# Patient Record
Sex: FEMALE | Race: WHITE | Employment: OTHER | URBAN - METROPOLITAN AREA
[De-identification: names, ages, dates, MRNs, and addresses within clinical notes are randomized per-mention and may not be internally consistent; named-entity substitution may affect disease eponyms.]

---

## 2022-10-03 ENCOUNTER — APPOINTMENT (OUTPATIENT)
Dept: CT IMAGING | Age: 72
End: 2022-10-03
Attending: EMERGENCY MEDICINE
Payer: MEDICARE

## 2022-10-03 ENCOUNTER — APPOINTMENT (OUTPATIENT)
Dept: MRI IMAGING | Age: 72
End: 2022-10-03
Attending: EMERGENCY MEDICINE
Payer: MEDICARE

## 2022-10-03 ENCOUNTER — HOSPITAL ENCOUNTER (EMERGENCY)
Age: 72
Discharge: ANOTHER HOSPITAL | End: 2022-10-04
Attending: EMERGENCY MEDICINE
Payer: MEDICARE

## 2022-10-03 ENCOUNTER — APPOINTMENT (OUTPATIENT)
Dept: GENERAL RADIOLOGY | Age: 72
End: 2022-10-03
Attending: EMERGENCY MEDICINE
Payer: MEDICARE

## 2022-10-03 VITALS
TEMPERATURE: 98.2 F | OXYGEN SATURATION: 96 % | SYSTOLIC BLOOD PRESSURE: 145 MMHG | HEIGHT: 64 IN | WEIGHT: 132 LBS | HEART RATE: 92 BPM | RESPIRATION RATE: 15 BRPM | DIASTOLIC BLOOD PRESSURE: 67 MMHG | BODY MASS INDEX: 22.53 KG/M2

## 2022-10-03 DIAGNOSIS — I61.5: Primary | ICD-10-CM

## 2022-10-03 DIAGNOSIS — G93.40 ENCEPHALOPATHY: ICD-10-CM

## 2022-10-03 LAB
ALBUMIN SERPL-MCNC: 3.7 G/DL (ref 3.4–5)
ALBUMIN/GLOB SERPL: 0.9 {RATIO} (ref 0.8–1.7)
ALP SERPL-CCNC: 151 U/L (ref 45–117)
ALT SERPL-CCNC: 24 U/L (ref 13–56)
ANION GAP SERPL CALC-SCNC: 8 MMOL/L (ref 3–18)
APPEARANCE UR: ABNORMAL
AST SERPL-CCNC: 27 U/L (ref 10–38)
BACTERIA URNS QL MICRO: ABNORMAL /HPF
BASOPHILS # BLD: 0 K/UL (ref 0–0.1)
BASOPHILS NFR BLD: 0 % (ref 0–2)
BILIRUB SERPL-MCNC: 1.8 MG/DL (ref 0.2–1)
BILIRUB UR QL: NEGATIVE
BUN SERPL-MCNC: 33 MG/DL (ref 7–18)
BUN/CREAT SERPL: 36 (ref 12–20)
CALCIUM SERPL-MCNC: 9.8 MG/DL (ref 8.5–10.1)
CALCULATED R AXIS, ECG10: 45 DEGREES
CALCULATED R AXIS, ECG10: 53 DEGREES
CALCULATED T AXIS, ECG11: 82 DEGREES
CALCULATED T AXIS, ECG11: 84 DEGREES
CHLORIDE SERPL-SCNC: 96 MMOL/L (ref 100–111)
CO2 SERPL-SCNC: 30 MMOL/L (ref 21–32)
COLOR UR: YELLOW
COVID-19 RAPID TEST, COVR: NOT DETECTED
CREAT SERPL-MCNC: 0.91 MG/DL (ref 0.6–1.3)
DIAGNOSIS, 93000: NORMAL
DIAGNOSIS, 93000: NORMAL
DIFFERENTIAL METHOD BLD: ABNORMAL
EOSINOPHIL # BLD: 0 K/UL (ref 0–0.4)
EOSINOPHIL NFR BLD: 0 % (ref 0–5)
EPITH CASTS URNS QL MICRO: ABNORMAL /LPF (ref 0–5)
ERYTHROCYTE [DISTWIDTH] IN BLOOD BY AUTOMATED COUNT: 12.8 % (ref 11.6–14.5)
GLOBULIN SER CALC-MCNC: 4 G/DL (ref 2–4)
GLUCOSE SERPL-MCNC: 153 MG/DL (ref 74–99)
GLUCOSE UR STRIP.AUTO-MCNC: NEGATIVE MG/DL
HCT VFR BLD AUTO: 51.2 % (ref 35–45)
HGB BLD-MCNC: 17.2 G/DL (ref 12–16)
HGB UR QL STRIP: ABNORMAL
IMM GRANULOCYTES # BLD AUTO: 0.1 K/UL (ref 0–0.04)
IMM GRANULOCYTES NFR BLD AUTO: 1 % (ref 0–0.5)
KETONES UR QL STRIP.AUTO: ABNORMAL MG/DL
LACTATE SERPL-SCNC: 1.9 MMOL/L (ref 0.4–2)
LEUKOCYTE ESTERASE UR QL STRIP.AUTO: ABNORMAL
LYMPHOCYTES # BLD: 3.1 K/UL (ref 0.9–3.6)
LYMPHOCYTES NFR BLD: 18 % (ref 21–52)
MCH RBC QN AUTO: 30.9 PG (ref 24–34)
MCHC RBC AUTO-ENTMCNC: 33.6 G/DL (ref 31–37)
MCV RBC AUTO: 91.9 FL (ref 78–100)
MONOCYTES # BLD: 0.9 K/UL (ref 0.05–1.2)
MONOCYTES NFR BLD: 5 % (ref 3–10)
NEUTS SEG # BLD: 12.5 K/UL (ref 1.8–8)
NEUTS SEG NFR BLD: 76 % (ref 40–73)
NITRITE UR QL STRIP.AUTO: NEGATIVE
NRBC # BLD: 0 K/UL (ref 0–0.01)
NRBC BLD-RTO: 0 PER 100 WBC
PH UR STRIP: 5.5 [PH] (ref 5–8)
PLATELET # BLD AUTO: 386 K/UL (ref 135–420)
PMV BLD AUTO: 10.5 FL (ref 9.2–11.8)
POTASSIUM SERPL-SCNC: 3.6 MMOL/L (ref 3.5–5.5)
PROT SERPL-MCNC: 7.7 G/DL (ref 6.4–8.2)
PROT UR STRIP-MCNC: 30 MG/DL
Q-T INTERVAL, ECG07: 382 MS
Q-T INTERVAL, ECG07: 406 MS
QRS DURATION, ECG06: 80 MS
QRS DURATION, ECG06: 82 MS
QTC CALCULATION (BEZET), ECG08: 496 MS
QTC CALCULATION (BEZET), ECG08: 502 MS
RBC # BLD AUTO: 5.57 M/UL (ref 4.2–5.3)
RBC #/AREA URNS HPF: ABNORMAL /HPF (ref 0–5)
SODIUM SERPL-SCNC: 134 MMOL/L (ref 136–145)
SOURCE, COVRS: NORMAL
SP GR UR REFRACTOMETRY: >1.03 (ref 1–1.03)
TROPONIN-HIGH SENSITIVITY: 244 NG/L (ref 0–54)
TROPONIN-HIGH SENSITIVITY: 256 NG/L (ref 0–54)
UROBILINOGEN UR QL STRIP.AUTO: 1 EU/DL (ref 0.2–1)
VENTRICULAR RATE, ECG03: 104 BPM
VENTRICULAR RATE, ECG03: 90 BPM
WBC # BLD AUTO: 16.5 K/UL (ref 4.6–13.2)
WBC URNS QL MICRO: ABNORMAL /HPF (ref 0–5)

## 2022-10-03 PROCEDURE — 80053 COMPREHEN METABOLIC PANEL: CPT

## 2022-10-03 PROCEDURE — 96365 THER/PROPH/DIAG IV INF INIT: CPT

## 2022-10-03 PROCEDURE — 99285 EMERGENCY DEPT VISIT HI MDM: CPT

## 2022-10-03 PROCEDURE — A9577 INJ MULTIHANCE: HCPCS | Performed by: EMERGENCY MEDICINE

## 2022-10-03 PROCEDURE — 81001 URINALYSIS AUTO W/SCOPE: CPT

## 2022-10-03 PROCEDURE — 71045 X-RAY EXAM CHEST 1 VIEW: CPT

## 2022-10-03 PROCEDURE — 85025 COMPLETE CBC W/AUTO DIFF WBC: CPT

## 2022-10-03 PROCEDURE — 74177 CT ABD & PELVIS W/CONTRAST: CPT

## 2022-10-03 PROCEDURE — 84484 ASSAY OF TROPONIN QUANT: CPT

## 2022-10-03 PROCEDURE — 74011000250 HC RX REV CODE- 250: Performed by: EMERGENCY MEDICINE

## 2022-10-03 PROCEDURE — 93005 ELECTROCARDIOGRAM TRACING: CPT

## 2022-10-03 PROCEDURE — 96375 TX/PRO/DX INJ NEW DRUG ADDON: CPT

## 2022-10-03 PROCEDURE — 83605 ASSAY OF LACTIC ACID: CPT

## 2022-10-03 PROCEDURE — 74011000258 HC RX REV CODE- 258: Performed by: EMERGENCY MEDICINE

## 2022-10-03 PROCEDURE — 87635 SARS-COV-2 COVID-19 AMP PRB: CPT

## 2022-10-03 PROCEDURE — 71275 CT ANGIOGRAPHY CHEST: CPT

## 2022-10-03 PROCEDURE — 70553 MRI BRAIN STEM W/O & W/DYE: CPT

## 2022-10-03 PROCEDURE — 87040 BLOOD CULTURE FOR BACTERIA: CPT

## 2022-10-03 PROCEDURE — 74011250636 HC RX REV CODE- 250/636: Performed by: EMERGENCY MEDICINE

## 2022-10-03 PROCEDURE — 70450 CT HEAD/BRAIN W/O DYE: CPT

## 2022-10-03 PROCEDURE — 96367 TX/PROPH/DG ADDL SEQ IV INF: CPT

## 2022-10-03 PROCEDURE — 74011000636 HC RX REV CODE- 636: Performed by: EMERGENCY MEDICINE

## 2022-10-03 RX ORDER — NICARDIPINE HYDROCHLORIDE 0.1 MG/ML
5-15 INJECTION INTRAVENOUS
Status: DISCONTINUED | OUTPATIENT
Start: 2022-10-03 | End: 2022-10-03

## 2022-10-03 RX ADMIN — SODIUM CHLORIDE 5 MG/HR: 9 INJECTION, SOLUTION INTRAVENOUS at 22:56

## 2022-10-03 RX ADMIN — IOPAMIDOL 100 ML: 755 INJECTION, SOLUTION INTRAVENOUS at 17:11

## 2022-10-03 RX ADMIN — GADOBENATE DIMEGLUMINE 12 ML: 529 INJECTION, SOLUTION INTRAVENOUS at 20:17

## 2022-10-03 RX ADMIN — SODIUM CHLORIDE 2.5 MG/HR: 9 INJECTION, SOLUTION INTRAVENOUS at 23:20

## 2022-10-03 RX ADMIN — SODIUM CHLORIDE 1000 ML: 9 INJECTION, SOLUTION INTRAVENOUS at 16:31

## 2022-10-03 RX ADMIN — AZITHROMYCIN MONOHYDRATE 500 MG: 500 INJECTION, POWDER, LYOPHILIZED, FOR SOLUTION INTRAVENOUS at 21:41

## 2022-10-03 RX ADMIN — CEFTRIAXONE 2 G: 2 INJECTION, POWDER, FOR SOLUTION INTRAMUSCULAR; INTRAVENOUS at 20:52

## 2022-10-03 NOTE — ED TRIAGE NOTES
Pt arrives via ems stretcher from home with c\o ams, pt normally a&o x 4, pt is now a&o x 2, pt is able to make needs known speaking in complete sentences, pt in nad at this time, last known normal is unknown  Per ems pt has not eaten in 10 days, pt also endorses drinking daily, last drink is yesterday

## 2022-10-04 NOTE — ED NOTES
Called report to Alejandro Guzman RN at [facility] 11th floor. Verbalized understanding of report, all questions answered. Pt prepped for transfer.

## 2022-10-04 NOTE — ED PROVIDER NOTES
EMERGENCY DEPARTMENT HISTORY AND PHYSICAL EXAM      Date: 10/3/2022  Patient Name: Mekhi Kimble    History of Presenting Illness     Chief Complaint   Patient presents with    Altered mental status       History Provided By: Patient    HPI: Mekhi Kimble, 80 y.o. female without known significant medical history presents to the ED with cc of altered mental status. Patient lives at home, brought in by family who had not seen her for 9 days, last known normal 9 days ago. Patient did not recognize her own sister and was reportedly confused by family members at bedside. This is an acute change from her baseline mental status. Patient does not remember the president and this is an acute change as she reportedly likes to discuss politics with her family members. Family members report that she has had very little p.o. intake over the past few days and they are concerned for dehydration. No reports of fevers and patient denies any chest pain, shortness of breath, nausea, vomiting, abdominal pain. Of note patient has been getting worked up at [facility] for [employer] with plans for biopsy. Remainder of history and ROS limited secondary to patient's confusion. There are no other complaints, changes, or physical findings at this time. PCP: Mary Serrano, DO    No current facility-administered medications on file prior to encounter. Current Outpatient Medications on File Prior to Encounter   Medication Sig Dispense Refill    ibuprofen (MOTRIN) 200 mg tablet Take 600 mg by mouth. Past History     Past Medical History:  Past Medical History:   Diagnosis Date    Arthritis        Past Surgical History:  Past Surgical History:   Procedure Laterality Date    HX HYSTERECTOMY      HX OTHER SURGICAL      \"throat gland taken out\". Family History:  No family history on file.     Social History:  Social History     Tobacco Use    Smoking status: Every Day     Packs/day: 1.00     Types: Cigarettes   Substance Use Topics    Alcohol use: No       Allergies: Allergies   Allergen Reactions    Codeine Nausea and Vomiting    Penicillins Hives         Review of Systems   Review of Systems   Unable to perform ROS: Mental status change     Physical Exam   Physical Exam  Vitals and nursing note reviewed. Constitutional:       General: She is not in acute distress. Appearance: Normal appearance. She is not ill-appearing or toxic-appearing. HENT:      Head: Normocephalic and atraumatic. Nose: Nose normal.      Mouth/Throat:      Mouth: Mucous membranes are moist.   Eyes:      Extraocular Movements: Extraocular movements intact. Pupils: Pupils are equal, round, and reactive to light. Cardiovascular:      Rate and Rhythm: Normal rate. Rhythm irregular. Heart sounds: No murmur heard. Pulmonary:      Effort: Pulmonary effort is normal. No respiratory distress. Breath sounds: Normal breath sounds. No wheezing. Abdominal:      General: There is no distension. Palpations: Abdomen is soft. Tenderness: There is no abdominal tenderness. There is no guarding or rebound. Musculoskeletal:         General: No swelling or tenderness. Normal range of motion. Cervical back: Normal range of motion and neck supple. Right lower leg: No edema. Left lower leg: No edema. Skin:     General: Skin is warm and dry. Coloration: Skin is not pale. Findings: No erythema. Neurological:      General: No focal deficit present. Mental Status: She is alert. Comments: Patient is awake alert and oriented to person and year but not place or situation. Moves all extremities, cooperates with exam, no focal deficits appreciated.        Diagnostic Study Results     Labs -     Recent Results (from the past 12 hour(s))   EKG, 12 LEAD, INITIAL    Collection Time: 10/03/22  3:08 PM   Result Value Ref Range    Ventricular Rate 104 BPM    QRS Duration 82 ms    Q-T Interval 382 ms    QTC Calculation (Bezet) 502 ms    Calculated R Axis 53 degrees    Calculated T Axis 84 degrees    Diagnosis       Atrial fibrillation with rapid ventricular response  Marked ST abnormality, possible inferior subendocardial injury  Abnormal ECG  No previous ECGs available     CBC WITH AUTOMATED DIFF    Collection Time: 10/03/22  3:15 PM   Result Value Ref Range    WBC 16.5 (H) 4.6 - 13.2 K/uL    RBC 5.57 (H) 4.20 - 5.30 M/uL    HGB 17.2 (H) 12.0 - 16.0 g/dL    HCT 51.2 (H) 35.0 - 45.0 %    MCV 91.9 78.0 - 100.0 FL    MCH 30.9 24.0 - 34.0 PG    MCHC 33.6 31.0 - 37.0 g/dL    RDW 12.8 11.6 - 14.5 %    PLATELET 315 312 - 903 K/uL    MPV 10.5 9.2 - 11.8 FL    NRBC 0.0 0  WBC    ABSOLUTE NRBC 0.00 0.00 - 0.01 K/uL    NEUTROPHILS 76 (H) 40 - 73 %    LYMPHOCYTES 18 (L) 21 - 52 %    MONOCYTES 5 3 - 10 %    EOSINOPHILS 0 0 - 5 %    BASOPHILS 0 0 - 2 %    IMMATURE GRANULOCYTES 1 (H) 0.0 - 0.5 %    ABS. NEUTROPHILS 12.5 (H) 1.8 - 8.0 K/UL    ABS. LYMPHOCYTES 3.1 0.9 - 3.6 K/UL    ABS. MONOCYTES 0.9 0.05 - 1.2 K/UL    ABS. EOSINOPHILS 0.0 0.0 - 0.4 K/UL    ABS. BASOPHILS 0.0 0.0 - 0.1 K/UL    ABS. IMM. GRANS. 0.1 (H) 0.00 - 0.04 K/UL    DF AUTOMATED     METABOLIC PANEL, COMPREHENSIVE    Collection Time: 10/03/22  3:15 PM   Result Value Ref Range    Sodium 134 (L) 136 - 145 mmol/L    Potassium 3.6 3.5 - 5.5 mmol/L    Chloride 96 (L) 100 - 111 mmol/L    CO2 30 21 - 32 mmol/L    Anion gap 8 3.0 - 18 mmol/L    Glucose 153 (H) 74 - 99 mg/dL    BUN 33 (H) 7.0 - 18 MG/DL    Creatinine 0.91 0.6 - 1.3 MG/DL    BUN/Creatinine ratio 36 (H) 12 - 20      eGFR >60 >60 ml/min/1.73m2    Calcium 9.8 8.5 - 10.1 MG/DL    Bilirubin, total 1.8 (H) 0.2 - 1.0 MG/DL    ALT (SGPT) 24 13 - 56 U/L    AST (SGOT) 27 10 - 38 U/L    Alk.  phosphatase 151 (H) 45 - 117 U/L    Protein, total 7.7 6.4 - 8.2 g/dL    Albumin 3.7 3.4 - 5.0 g/dL    Globulin 4.0 2.0 - 4.0 g/dL    A-G Ratio 0.9 0.8 - 1.7     TROPONIN-HIGH SENSITIVITY    Collection Time: 10/03/22  3:15 PM   Result Value Ref Range    Troponin-High Sensitivity 256 (HH) 0 - 54 ng/L   TROPONIN-HIGH SENSITIVITY    Collection Time: 10/03/22  4:30 PM   Result Value Ref Range    Troponin-High Sensitivity 244 (HH) 0 - 54 ng/L   EKG, 12 LEAD, INITIAL    Collection Time: 10/03/22  4:31 PM   Result Value Ref Range    Ventricular Rate 90 BPM    QRS Duration 80 ms    Q-T Interval 406 ms    QTC Calculation (Bezet) 496 ms    Calculated R Axis 45 degrees    Calculated T Axis 82 degrees    Diagnosis       Atrial fibrillation  ST & T wave abnormality, consider inferior ischemia  Prolonged QT  Abnormal ECG  When compared with ECG of 03-OCT-2022 15:08,  T wave inversion now evident in Inferior leads  Nonspecific T wave abnormality now evident in Anterior leads     LACTIC ACID    Collection Time: 10/03/22  5:49 PM   Result Value Ref Range    Lactic acid 1.9 0.4 - 2.0 MMOL/L   COVID-19 RAPID TEST    Collection Time: 10/03/22  5:52 PM   Result Value Ref Range    Specimen source Nasopharyngeal      COVID-19 rapid test Not detected NOTD     URINALYSIS W/ RFLX MICROSCOPIC    Collection Time: 10/03/22  7:03 PM   Result Value Ref Range    Color YELLOW      Appearance CLOUDY      Specific gravity >1.030 (H) 1.003 - 1.030    pH (UA) 5.5 5.0 - 8.0      Protein 30 (A) NEG mg/dL    Glucose Negative NEG mg/dL    Ketone TRACE (A) NEG mg/dL    Bilirubin Negative NEG      Blood TRACE (A) NEG      Urobilinogen 1.0 0.2 - 1.0 EU/dL    Nitrites Negative NEG      Leukocyte Esterase MODERATE (A) NEG     URINE MICROSCOPIC ONLY    Collection Time: 10/03/22  7:03 PM   Result Value Ref Range    WBC 21 to 35 0 - 5 /hpf    RBC 0 to 5 0 - 5 /hpf    Epithelial cells 3+ 0 - 5 /lpf    Bacteria 1+ (A) NEG /hpf       Radiologic Studies -   CT ABD PELV W CONT   Final Result      1. No acute intra-abdominal abnormality. 2. Incompletely imaged central left lower lobe lung masslike consolidation.    There appears to be enhancement of this consolidation compared to the prior CTA   chest, concerning for malignancy. 3. Right adrenal nodule, nonspecific. Differential diagnosis would include   adenoma or metastatic disease (if left lower lobe lesion proved to be   malignancy). CTA CHEST W OR W WO CONT   Final Result      1. No evidence of pulmonary embolism. 2. Masslike consolidation within the central left lower lobe corresponding to   the chest radiographic finding. Differential diagnosed include pneumonia or   malignancy. Suggest follow-up chest radiograph in several weeks after   appropriate therapy if pneumonia is clinically suspected. 3. Incompletely imaged right adrenal nodule (19 x 14 mm). Differential diagnosed   include metastatic disease or adenoma. CT HEAD WO CONT   Final Result      1. No prior studies. 2. Hyperdensity noted within the posterior aspect of the right lateral   ventricle, not clearly layering. Additional diffuse prominence of the   ventricular system out of proportion to the cerebral sulci prominence. Findings   may represent acute hemorrhage or intraventricular mass. 3. Hyperdense lesion within the posterior medial inferior aspect of the   posterior fossa (up to 13 mm), potentially meningioma, without significant mass   effect. Suggest MRI brain with and without contrast for further assessment. XR CHEST PORT   Final Result      Left retrocardiac opacity, potentially pneumonia or pneumonitis. No accompanying   pleural abnormality. MRI BRAIN W WO CONT    (Results Pending)   CTA HEAD    (Results Pending)     CT Results  (Last 48 hours)                 10/03/22 1750  CT ABD PELV W CONT Final result    Impression:      1. No acute intra-abdominal abnormality. 2. Incompletely imaged central left lower lobe lung masslike consolidation. There appears to be enhancement of this consolidation compared to the prior CTA   chest, concerning for malignancy.        3. Right adrenal nodule, nonspecific. Differential diagnosis would include   adenoma or metastatic disease (if left lower lobe lesion proved to be   malignancy). Narrative:  EXAM: CT of the abdomen and pelvis       CLINICAL INDICATION/HISTORY: AMS, eval for infection     > Additional: None. COMPARISON: None. > Reference Exam: None. TECHNIQUE: Axial CT imaging of the abdomen and pelvis was performed with   intravenous contrast. Multiplanar reformats were generated. One or more dose   reduction techniques were used on this CT: automated exposure control,   adjustment of the mAs and/or kVp according to patient size, and iterative   reconstruction techniques. The specific techniques used on this CT exam have   been documented in the patient's electronic medical record. Digital Imaging and   Communications in Medicine (DICOM) format image data are available to   nonaffiliated external healthcare facilities or entities on a secure, media   free, reciprocally searchable basis with patient authorization for at least a   12-month period after this study. _______________       FINDINGS:       LOWER CHEST: Incompletely imaged central left lower lobe masslike consolidation   demonstrating 91 Hounsfield units, appears to be enhancing. LIVER, BILIARY: Liver is normal. No biliary dilation. Gallbladder is   unremarkable. PANCREAS: Normal.       SPLEEN: Normal.       ADRENALS: 17 x 15 right adrenal nodule measuring 50 Hounsfield units. Unremarkable left adrenal gland. KIDNEYS/URETERS/BLADDER: No hydronephrosis or hydroureter. Unremarkable bladder. LYMPH NODES: No enlarged lymph nodes. GASTROINTESTINAL TRACT: No bowel dilation or wall thickening. PELVIC ORGANS: Hysterectomy. VASCULATURE: Moderate atherosclerosis. BONES: No acute or aggressive osseous abnormalities identified. OTHER: No ascites.        _______________           10/03/22 1744  CTA CHEST W OR W WO CONT Final result    Impression:      1. No evidence of pulmonary embolism. 2. Masslike consolidation within the central left lower lobe corresponding to   the chest radiographic finding. Differential diagnosed include pneumonia or   malignancy. Suggest follow-up chest radiograph in several weeks after   appropriate therapy if pneumonia is clinically suspected. 3. Incompletely imaged right adrenal nodule (19 x 14 mm). Differential diagnosed   include metastatic disease or adenoma. Narrative:  EXAM: CTA chest       CLINICAL INDICATION/HISTORY: elevated troponin, AMS - r/o PE. eval for   retrocardiac mass vs pna     > Additional: None. COMPARISON: None. > Reference Exam: None. TECHNIQUE: Axial CT imaging from the thoracic inlet through the diaphragm with   intravenous contrast. Coronal and sagittal MIP reformats were generated. One or   more dose reduction techniques were used on this CT: automated exposure control,   adjustment of the mAs and/or kVp according to patient size, and iterative   reconstruction techniques. The specific techniques used on this CT exam have   been documented in the patient's electronic medical record. Digital Imaging and   Communications in Medicine (DICOM) format image data are available to   nonaffiliated external healthcare facilities or entities on a secure, media   free, reciprocally searchable basis with patient authorization for at least a   12-month period after this study. _______________       FINDINGS:       EXAM QUALITY: Adequate. Limited assessment of the main right pulmonary artery   due to beam hardening artifact secondary to dense contrast within the adjacent   SVC. PULMONARY ARTERIES: No evidence of pulmonary embolism. MEDIASTINUM: Borderline enlarged heart. No pericardial effusion. Aorta is   atherosclerotic without evidence for dissection. Moderate to severe coronary   atherosclerosis.        LUNGS: Mild emphysematous changes. Masslike consolidation within the central   left lower lobe measuring 42 x 36 mm. PLEURA: No significant pleural effusion. No pneumothorax. AIRWAY: Central airways are patent. LYMPH NODES: No enlarged nodes. UPPER ABDOMEN: 19 x 14 mm right adrenal nodule, incompletely imaged. OTHER: No acute or aggressive osseous abnormalities identified. _______________           10/03/22 1725  CT HEAD WO CONT Final result    Impression:      1. No prior studies. 2. Hyperdensity noted within the posterior aspect of the right lateral   ventricle, not clearly layering. Additional diffuse prominence of the   ventricular system out of proportion to the cerebral sulci prominence. Findings   may represent acute hemorrhage or intraventricular mass. 3. Hyperdense lesion within the posterior medial inferior aspect of the   posterior fossa (up to 13 mm), potentially meningioma, without significant mass   effect. Suggest MRI brain with and without contrast for further assessment. Narrative:  EXAM: CT head       CLINICAL INDICATION/HISTORY: AMS     > Additional: None. COMPARISON: None. > Reference Exam: None. TECHNIQUE: Axial CT imaging of the head was performed without intravenous   contrast. Sagittal and coronal reconstructions were performed. One or more dose   reduction techniques were used on this CT: automated exposure control,   adjustment of the mAs and/or kVp according to patient size, and iterative   reconstruction techniques. The specific techniques used on this CT exam have   been documented in the patient's electronic medical record. Digital Imaging and   Communications in Medicine (DICOM) format image data are available to   nonaffiliated external healthcare facilities or entities on a secure, media   free, reciprocally searchable basis with patient authorization for at least a   12-month period after this study.        _______________ FINDINGS:       BRAIN, POSTERIOR FOSSA, EXTRA-AXIAL SPACES: The ventricles are moderately   prominent with prominent temporal tips as well as prominent fourth ventricle,   appears out of proportion to prominence of the cerebral sulci. Hyperdensity   noted within the posterior aspect of the right lateral ventricle, not clearly   layering. Additional hyperdense extra-axial lesion within the posterior medial   inferior aspect of the posterior fossa measuring 13 x 8 x 7 mm. CALVARIUM: Intact. SINUSES: Clear. OTHER: None.       _______________                 CXR Results  (Last 48 hours)                 10/03/22 1551  XR CHEST PORT Final result    Impression:      Left retrocardiac opacity, potentially pneumonia or pneumonitis. No accompanying   pleural abnormality. Narrative:  EXAM: XR CHEST PORT       CLINICAL INDICATION/HISTORY: ams   -Additional: Altered mental status       COMPARISON: None       TECHNIQUE: Frontal view of the chest       _______________       FINDINGS:       HEART AND MEDIASTINUM: Normal cardiac size and mediastinal contours. LUNGS AND PLEURAL SPACES: Lungs are underexpanded. Left retrocardiac opacity is   present, partially obscuring the adjacent hemidiaphragm. No pneumothorax or   pleural effusion. BONY THORAX AND SOFT TISSUES: No acute osseous abnormality       _______________                   Medical Decision Making   I am the first provider for this patient. I reviewed the vital signs, available nursing notes, past medical history, past surgical history, family history and social history. Vital Signs-Reviewed the patient's vital signs.   Patient Vitals for the past 12 hrs:   Temp Pulse Resp BP SpO2   10/03/22 2100 -- 95 17 (!) 144/92 97 %   10/03/22 2054 -- 89 19 (!) 155/74 95 %   10/03/22 1558 -- 97 23 -- --   10/03/22 1513 -- 98 19 112/73 --   10/03/22 1505 98.2 °F (36.8 °C) (!) 108 18 112/73 98 %       Records Reviewed: Nursing Notes    Provider Notes (Medical Decision Making):   76-year-old female with above history presenting with acute encephalopathy, last known normal 9 days ago. She is afebrile and vital signs are stable and has no focal deficits on exam but she is only oriented to person and year but not place or situation. She is noted to have leukocytosis with elevated hemoglobin, could indicate dehydration versus infection. Chest x-ray with concern for retrocardiac mass versus pneumonia. Urinalysis with possible infection, I will cover with empiric Rocephin and azithromycin to cover for both UTI versus commune acquired pneumonia. CT head with concern for intraventricular bleed versus mass, I obtained MRI which also demonstrates concern for acute blood products seen within the ventricles with concern for hydrocephalus this would explain her acute encephalopathy. Patient will require transfer to another facility for higher level of care and neurosurgical evaluation. Currently blood pressure at goal 140/90 but I will start nicardipine drip to keep SBP less than 160. It appears that patient is on aspirin but no anticoagulation. ED Course:   Initial assessment performed. The patients presenting problems have been discussed, and they are in agreement with the care plan formulated and outlined with them. I have encouraged them to ask questions as they arise throughout their visit. ED Course as of 10/03/22 2208   Mon Oct 03, 2022   1635 EKG per my interpretation atrial fibrillation, rate 90 bpm, nonspecific ST-T wave abnormality, no ST elevation, prolonged  ms. [AK]   2118 MRI report from radiology:   1) No acute infarct  2) small amount of acute blood products seen within the ventricles. 3) ventricles are prominent out of proportion of the sulci which may represent an element of hydrocephalus.     With these acute findings with concern for intraventricular hemorrhage, I will consult neurosurgery, patient will require transfer to another facility for higher level of care. [AK]   3796 Spoke with Dr. Tiff Persaud, Neurosurgery at [facility], has accepted patient to [address] ICU at . [address]      ED Course User Index  [AK] Arnaldo Lincoln MD       Transfer Note:   Patient is being transferred to [facility]. Transfer was accepted by Dr. Donna Shetty. The reasons for their transfer have been discussed with them and available family. They convey agreement and understanding for the need to be transferred as explained to them by me. Critical Care Documentation  I have spent 120 minutes of critical care time in evaluating and treating this patient. This includes time spent at bedside, time with family and decision makers, documentation, review of labs and imaging, and/or consultation with specialists. It does not include time spent on separately billed procedures. This patient presents with a critical illness or injury that acutely impairs one or more vital organ systems such that there is a high probability of imminent or life threatening deterioration in the patient's condition. This case involved decision making of high complexity to assess, manipulate, and support vital organ system failure and/or to prevent further life threatening deterioration of the patient's condition. Failure to initiate these interventions on an urgent basis would likely result in sudden, clinically significant or life threatening deterioration in the patient's condition. This case required my direct attention, intervention, and personal management for the time documented above. This time does not include separately billed interventions/procedures which are separately documented.      Abnormal findings supporting critical care: Acute encephalopathy secondary to intraventricular blood products with hydrocephalus  Interventions to support critical care: Initiation and titration of nicardipine drip, frequent CNS and respiratory reassessment, consultation with specialist, arrange for transfer to another facility for higher level of care  Failure to intervene may result in: CNS depression, loss of airway, respiratory failure, death  Steven Montez MD      Disposition:  Transfer to [facility]      Diagnosis     Clinical Impression:   1. Closed intraventricular hemorrhage (HCC)        Attestations:  I am the first and primary provider of record for this patient's ED encounter. I personally performed the services described above in this documentation. Steven Montez MD    Please note that this dictation was completed with [employer], the computer voice recognition software. Quite often unanticipated grammatical, syntax, homophones, and other interpretive errors are inadvertently transcribed by the computer software. Please disregard these errors. Please excuse any errors that have escaped final proofreading. Thank you.

## 2022-10-09 LAB
BACTERIA SPEC CULT: NORMAL
BACTERIA SPEC CULT: NORMAL
SERVICE CMNT-IMP: NORMAL
SERVICE CMNT-IMP: NORMAL

## 2023-05-20 RX ORDER — IBUPROFEN 200 MG
600 TABLET ORAL
COMMUNITY